# Patient Record
Sex: MALE | Race: WHITE | NOT HISPANIC OR LATINO | Employment: STUDENT | URBAN - METROPOLITAN AREA
[De-identification: names, ages, dates, MRNs, and addresses within clinical notes are randomized per-mention and may not be internally consistent; named-entity substitution may affect disease eponyms.]

---
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
## 2025-05-12 ENCOUNTER — OFFICE VISIT (OUTPATIENT)
Dept: FAMILY MEDICINE | Facility: CLINIC | Age: 20
End: 2025-05-12
Payer: COMMERCIAL

## 2025-05-12 VITALS
HEIGHT: 70 IN | TEMPERATURE: 98 F | BODY MASS INDEX: 38.41 KG/M2 | SYSTOLIC BLOOD PRESSURE: 160 MMHG | HEART RATE: 101 BPM | WEIGHT: 268.31 LBS | DIASTOLIC BLOOD PRESSURE: 72 MMHG | OXYGEN SATURATION: 98 %

## 2025-05-12 DIAGNOSIS — R05.8 PRODUCTIVE COUGH: Primary | ICD-10-CM

## 2025-05-12 PROCEDURE — 99204 OFFICE O/P NEW MOD 45 MIN: CPT | Mod: S$PBB,,, | Performed by: STUDENT IN AN ORGANIZED HEALTH CARE EDUCATION/TRAINING PROGRAM

## 2025-05-12 PROCEDURE — 99204 OFFICE O/P NEW MOD 45 MIN: CPT | Mod: PBBFAC,PO | Performed by: STUDENT IN AN ORGANIZED HEALTH CARE EDUCATION/TRAINING PROGRAM

## 2025-05-12 PROCEDURE — 99999 PR PBB SHADOW E&M-NEW PATIENT-LVL IV: CPT | Mod: PBBFAC,,, | Performed by: STUDENT IN AN ORGANIZED HEALTH CARE EDUCATION/TRAINING PROGRAM

## 2025-05-12 RX ORDER — DOXYCYCLINE 100 MG/1
100 CAPSULE ORAL 2 TIMES DAILY
COMMUNITY
Start: 2025-05-09

## 2025-05-12 RX ORDER — ALBUTEROL SULFATE 90 UG/1
1 INHALANT RESPIRATORY (INHALATION) EVERY 4 HOURS PRN
COMMUNITY
Start: 2025-05-09

## 2025-05-12 RX ORDER — BENZONATATE 100 MG/1
100 CAPSULE ORAL 3 TIMES DAILY PRN
Qty: 30 CAPSULE | Refills: 0 | Status: SHIPPED | OUTPATIENT
Start: 2025-05-12 | End: 2025-05-22

## 2025-05-12 NOTE — PROGRESS NOTES
"Problem visit/  Chief Complaint   Patient presents with    Cough     Symptoms x 3 weeks    Laryngitis    Generalized Body Aches    Fever    Fatigue    Excessive Sweating    Chills     Went to  in Friday and today. Currently taking Abt. Exposed to TB x 17 days ago.     Shortness of Breath    Emesis    Nausea    Dizziness       Subjective   Patient ID: Long Owens is a 19 y.o. male.    HPI  Long Owens is a 19 y.o.  who presents with productive cough.   Patient is being seen today for same day sick visit. Patient is new to provider. The patient is accompanied by mother.    He reports 19 days of productive cough, congestion fever Tmax 102 and chills. He states he went to the  Friday (3 days ago) and then again today. He reports that he had CXR which showed small amount of fluid on his right lung, otherwise normal. He was prescribed doxycycline. In addition, albuterol and promethazine. 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 He had TB skin test Friday and returned today for the recheck which was negative. He reports today he was given a steroid injection at the  and told to go to a doctor office to get blood work. He also reports vomiting. Denies urinary symptoms      Review of Systems  Objective      Vitals:    05/12/25 1706   BP: (!) 160/72   Pulse: 101   Temp: 97.9 °F (36.6 °C)   TempSrc: Oral   SpO2: 98%   Weight: 121.7 kg (268 lb 4.8 oz)   Height: 5' 10" (1.778 m)       Physical Exam  Vitals reviewed.   Constitutional:       General: He is not in acute distress.     Appearance: Normal appearance. He is not ill-appearing, toxic-appearing or diaphoretic.   HENT:      Head: Normocephalic and atraumatic.      Right Ear: Tympanic membrane, ear canal and external ear normal. There is no impacted cerumen.      Left Ear: Tympanic membrane, ear canal and external ear normal. There is no impacted cerumen.      Mouth/Throat:      Mouth: Mucous membranes are moist.      Pharynx: No " oropharyngeal exudate.   Eyes:      Conjunctiva/sclera: Conjunctivae normal.   Cardiovascular:      Rate and Rhythm: Normal rate.      Pulses: Normal pulses.   Pulmonary:      Effort: Pulmonary effort is normal. No respiratory distress.      Breath sounds: Normal breath sounds. No stridor. No wheezing or rhonchi.   Musculoskeletal:      Cervical back: Normal range of motion and neck supple. No tenderness.   Lymphadenopathy:      Cervical: No cervical adenopathy.   Neurological:      Mental Status: He is alert.         Procedures         Assessment & Plan   Productive cough  - The patient reports 19 days of a productive cough and fevers. He was seen at  today and 3 days ago. He is currently on doxycycline, promethazine, albuterol and received a steroid injection today  - The patient reports that despite being on the doxycyline for 3 days he does not feel any better. Also had negative Tb skin test after known exposure  - Patient is afebrile, with appropriate oxygenation hemodynamically stable with no acute findings on physical exam.  - Discussed with patient and mother - minimal utilization of CBC as WBC will be elevated in the setting of steroid injection. Will order new CXR to assess if any changes from 3 days ago  - Continue with current doxyx and albuterol. Will also prescribe tessalon perles. supportive treatment as needed motrin, tylenol, honey, tea    - After CXR will determine if change in abx is needed. Also discussed with patient and mother seeking treatment at ED to obtain immediate CT scan instead of waiting for outpatient scheduling if CXR remains negative   -     X-Ray Chest PA And Lateral; Future; Expected date: 05/12/2025  -     benzonatate (TESSALON) 100 MG capsule; Take 1 capsule (100 mg total) by mouth 3 (three) times daily as needed for Cough.  Dispense: 30 capsule; Refill: 0        Patient given return precautions and instructed to FU with PCP regarding symptoms.

## 2025-05-13 ENCOUNTER — TELEPHONE (OUTPATIENT)
Dept: FAMILY MEDICINE | Facility: CLINIC | Age: 20
End: 2025-05-13
Payer: COMMERCIAL

## 2025-05-13 ENCOUNTER — HOSPITAL ENCOUNTER (OUTPATIENT)
Dept: RADIOLOGY | Facility: HOSPITAL | Age: 20
Discharge: HOME | End: 2025-05-13
Attending: STUDENT IN AN ORGANIZED HEALTH CARE EDUCATION/TRAINING PROGRAM
Payer: COMMERCIAL

## 2025-05-13 ENCOUNTER — RESULTS FOLLOW-UP (OUTPATIENT)
Dept: FAMILY MEDICINE | Facility: CLINIC | Age: 20
End: 2025-05-13

## 2025-05-13 DIAGNOSIS — J18.9 PNEUMONIA OF LEFT LOWER LOBE DUE TO INFECTIOUS ORGANISM: Primary | ICD-10-CM

## 2025-05-13 DIAGNOSIS — R05.8 PRODUCTIVE COUGH: ICD-10-CM

## 2025-05-13 PROCEDURE — 71046 X-RAY EXAM CHEST 2 VIEWS: CPT | Mod: TC,FY,PO

## 2025-05-13 PROCEDURE — 71046 X-RAY EXAM CHEST 2 VIEWS: CPT | Mod: 26,,, | Performed by: RADIOLOGY

## 2025-05-13 RX ORDER — AZITHROMYCIN 500 MG/1
500 TABLET, FILM COATED ORAL DAILY
Qty: 3 TABLET | Refills: 0 | Status: SHIPPED | OUTPATIENT
Start: 2025-05-13 | End: 2025-05-14

## 2025-05-13 RX ORDER — AMOXICILLIN 500 MG/1
1000 TABLET, FILM COATED ORAL 3 TIMES DAILY
Qty: 30 TABLET | Refills: 0 | Status: SHIPPED | OUTPATIENT
Start: 2025-05-13 | End: 2025-05-18

## 2025-05-13 NOTE — TELEPHONE ENCOUNTER
Provider called patient to review  CXR which showed left lower lobe PNA - he is currently on doxyxcline for monotherapy which has not help. Will change to amoxicillin and zithromax. Pharmacy verified. He reported understanding and all questions answered

## 2025-05-14 PROBLEM — R04.2 HEMOPTYSIS: Status: ACTIVE | Noted: 2025-05-14

## 2025-05-14 PROBLEM — J01.00 ACUTE NON-RECURRENT MAXILLARY SINUSITIS: Status: ACTIVE | Noted: 2025-05-14

## 2025-05-14 PROBLEM — R09.82 POSTNASAL DRIP: Status: ACTIVE | Noted: 2025-05-14

## 2025-05-14 PROBLEM — R05.2 SUBACUTE COUGH: Status: ACTIVE | Noted: 2025-05-14

## 2025-05-14 PROBLEM — J35.1 ENLARGED TONSILS: Status: ACTIVE | Noted: 2025-05-14

## 2025-05-14 PROBLEM — R93.89 ABNORMAL CHEST X-RAY: Status: ACTIVE | Noted: 2025-05-14
